# Patient Record
Sex: FEMALE | Race: WHITE | Employment: PART TIME | URBAN - METROPOLITAN AREA
[De-identification: names, ages, dates, MRNs, and addresses within clinical notes are randomized per-mention and may not be internally consistent; named-entity substitution may affect disease eponyms.]

---

## 2017-01-03 ENCOUNTER — NURSE ONLY (OUTPATIENT)
Dept: FAMILY MEDICINE CLINIC | Facility: CLINIC | Age: 37
End: 2017-01-03

## 2017-01-03 DIAGNOSIS — E78.00 ELEVATED CHOLESTEROL: ICD-10-CM

## 2017-01-03 LAB
CHOLEST SMN-MCNC: 289 MG/DL (ref ?–200)
HDLC SERPL-MCNC: 78 MG/DL (ref 45–?)
HDLC SERPL: 3.71 {RATIO} (ref ?–4.44)
LDLC SERPL CALC-MCNC: 178 MG/DL (ref ?–130)
NONHDLC SERPL-MCNC: 211 MG/DL (ref ?–130)
TRIGLYCERIDES: 163 MG/DL (ref ?–150)
VLDL: 33 MG/DL (ref 5–40)

## 2017-01-03 PROCEDURE — 36415 COLL VENOUS BLD VENIPUNCTURE: CPT | Performed by: FAMILY MEDICINE

## 2017-01-03 PROCEDURE — 80061 LIPID PANEL: CPT | Performed by: FAMILY MEDICINE

## 2017-01-04 ENCOUNTER — TELEPHONE (OUTPATIENT)
Dept: FAMILY MEDICINE CLINIC | Facility: CLINIC | Age: 37
End: 2017-01-04

## 2017-01-04 DIAGNOSIS — E78.00 ELEVATED CHOLESTEROL: Primary | ICD-10-CM

## 2017-01-04 NOTE — TELEPHONE ENCOUNTER
Detailed message left for pt on cell (ok per consent) with instructions to call with questions/concerns. Reflex order and pt reminder entered into Epic.

## 2017-01-04 NOTE — TELEPHONE ENCOUNTER
----- Message from Gerry Westfall, DO sent at 1/3/2017  8:28 PM CST -----  I don't know what she has been doing different, but 4 years ago her labs wee pretty close to normal, and now her lipids are up 60 points.  I'd like her to make a concerted effort to tr

## 2017-01-09 ENCOUNTER — OFFICE VISIT (OUTPATIENT)
Dept: FAMILY MEDICINE CLINIC | Facility: CLINIC | Age: 37
End: 2017-01-09

## 2017-01-09 VITALS
RESPIRATION RATE: 16 BRPM | SYSTOLIC BLOOD PRESSURE: 130 MMHG | BODY MASS INDEX: 32.65 KG/M2 | HEIGHT: 64 IN | HEART RATE: 101 BPM | DIASTOLIC BLOOD PRESSURE: 88 MMHG | OXYGEN SATURATION: 99 % | WEIGHT: 191.25 LBS | TEMPERATURE: 98 F

## 2017-01-09 DIAGNOSIS — Z00.00 ROUTINE HEALTH MAINTENANCE: Primary | ICD-10-CM

## 2017-01-09 DIAGNOSIS — E66.09 NON MORBID OBESITY DUE TO EXCESS CALORIES: ICD-10-CM

## 2017-01-09 PROCEDURE — G0438 PPPS, INITIAL VISIT: HCPCS | Performed by: FAMILY MEDICINE

## 2017-01-09 PROCEDURE — 99395 PREV VISIT EST AGE 18-39: CPT | Performed by: FAMILY MEDICINE

## 2017-02-17 RX ORDER — VENLAFAXINE HYDROCHLORIDE 75 MG/1
75 TABLET, EXTENDED RELEASE ORAL DAILY
Qty: 30 TABLET | Refills: 0 | Status: SHIPPED | OUTPATIENT
Start: 2017-02-17 | End: 2017-03-20

## 2017-02-17 NOTE — TELEPHONE ENCOUNTER
Last fill 11/21/16 #30 with 2 refills  Last OV 1/9/17 physical  Future Appointments  Date Time Provider Shima Granados   7/10/2017 8:45 [place]  [facility] [place] [facility] [place]

## 2017-02-17 NOTE — TELEPHONE ENCOUNTER
From: Gama Mazariegos  To:  John Scott DO  Sent: 2/17/2017 11:15 AM CST  Subject: Medication Renewal Request    Original authorizing provider: DO Rosa Sanchez would like a refill of the following medications:  Venlafaxine HCl ER

## 2017-03-20 RX ORDER — VENLAFAXINE HYDROCHLORIDE 75 MG/1
75 TABLET, EXTENDED RELEASE ORAL DAILY
Qty: 30 TABLET | Refills: 6 | Status: SHIPPED | OUTPATIENT
Start: 2017-03-20 | End: 2017-04-19

## 2017-03-20 NOTE — TELEPHONE ENCOUNTER
From: Lara Powers  To: Belle Valentin MD  Sent: 3/20/2017 1:24 PM CDT  Subject: Medication Renewal Request    Original authorizing provider: MD Rosa Mcgoverndawit would like a refill of the following medications:  Venlafaxine HCl ER

## 2017-06-07 RX ORDER — VENLAFAXINE HYDROCHLORIDE 37.5 MG/1
TABLET, EXTENDED RELEASE ORAL
Qty: 30 TABLET | Refills: 6 | Status: SHIPPED | OUTPATIENT
Start: 2017-06-07

## 2017-06-07 NOTE — TELEPHONE ENCOUNTER
From: Jose Grant  To:  Jared Casillas DO  Sent: 6/7/2017 12:25 PM CDT  Subject: Medication Renewal Request    Original authorizing provider: DO Rosa Gilmore would like a refill of the following medications:  Venlafaxine HCl ER 3

## (undated) NOTE — MR AVS SNAPSHOT
[address] [facility] [id number] [phone]               Thank you for choosing us for your health care visit with Paige Llamas DO.   We are glad to serve you and happy to provide you with this sum view more details from this visit by going to [email or web address]. If you've recently had a stay at the Hospital you can access your discharge instructions in [employer] by going to Visits < Admission Summaries.  If you've been to the Emergency Depar priority on exercise in your life                    Visit [facility] online at  [email or web address]